# Patient Record
Sex: FEMALE | Race: WHITE
[De-identification: names, ages, dates, MRNs, and addresses within clinical notes are randomized per-mention and may not be internally consistent; named-entity substitution may affect disease eponyms.]

---

## 2019-02-28 ENCOUNTER — HOSPITAL ENCOUNTER (EMERGENCY)
Dept: HOSPITAL 10 - E/R | Age: 74
Discharge: HOME | End: 2019-02-28
Payer: COMMERCIAL

## 2019-02-28 ENCOUNTER — HOSPITAL ENCOUNTER (EMERGENCY)
Dept: HOSPITAL 91 - E/R | Age: 74
Discharge: HOME | End: 2019-02-28
Payer: COMMERCIAL

## 2019-02-28 VITALS — DIASTOLIC BLOOD PRESSURE: 79 MMHG | HEART RATE: 89 BPM | SYSTOLIC BLOOD PRESSURE: 128 MMHG | RESPIRATION RATE: 18 BRPM

## 2019-02-28 VITALS
WEIGHT: 176.81 LBS | HEIGHT: 63 IN | WEIGHT: 176.81 LBS | HEIGHT: 63 IN | BODY MASS INDEX: 31.33 KG/M2 | BODY MASS INDEX: 31.33 KG/M2

## 2019-02-28 DIAGNOSIS — I10: ICD-10-CM

## 2019-02-28 DIAGNOSIS — R06.02: ICD-10-CM

## 2019-02-28 DIAGNOSIS — Z79.82: ICD-10-CM

## 2019-02-28 DIAGNOSIS — E11.9: ICD-10-CM

## 2019-02-28 DIAGNOSIS — R09.89: ICD-10-CM

## 2019-02-28 DIAGNOSIS — Z79.84: ICD-10-CM

## 2019-02-28 DIAGNOSIS — J06.9: Primary | ICD-10-CM

## 2019-02-28 LAB
ADD MAN DIFF?: NO
ADD UMIC: YES
ALANINE AMINOTRANSFERASE: 36 IU/L (ref 13–69)
ALBUMIN/GLOBULIN RATIO: 1.32
ALBUMIN: 4.1 G/DL (ref 3.3–4.9)
ALKALINE PHOSPHATASE: 94 IU/L (ref 42–121)
ANION GAP: 10 (ref 5–13)
ASPARTATE AMINO TRANSFERASE: 28 IU/L (ref 15–46)
BASOPHIL #: 0 10^3/UL (ref 0–0.1)
BASOPHILS %: 0.5 % (ref 0–2)
BILIRUBIN,DIRECT: 0 MG/DL (ref 0–0.2)
BILIRUBIN,TOTAL: 0.3 MG/DL (ref 0.2–1.3)
BLOOD UREA NITROGEN: 13 MG/DL (ref 7–20)
CALCIUM: 9.7 MG/DL (ref 8.4–10.2)
CARBON DIOXIDE: 26 MMOL/L (ref 21–31)
CHLORIDE: 105 MMOL/L (ref 97–110)
CREATININE: 0.44 MG/DL (ref 0.44–1)
EOSINOPHILS #: 0.2 10^3/UL (ref 0–0.5)
EOSINOPHILS %: 2.6 % (ref 0–7)
GLOBULIN: 3.1 G/DL (ref 1.3–3.2)
GLUCOSE: 130 MG/DL (ref 70–220)
HEMATOCRIT: 37.9 % (ref 37–47)
HEMOGLOBIN: 13.3 G/DL (ref 12–16)
IMMATURE GRANS #M: 0.04 10^3/UL (ref 0–0.03)
IMMATURE GRANS % (M): 0.5 % (ref 0–0.43)
INR: 0.81
LYMPHOCYTES #: 1.6 10^3/UL (ref 0.8–2.9)
LYMPHOCYTES %: 18.4 % (ref 15–51)
MEAN CORPUSCULAR HEMOGLOBIN: 30.3 PG (ref 29–33)
MEAN CORPUSCULAR HGB CONC: 35.1 G/DL (ref 32–37)
MEAN CORPUSCULAR VOLUME: 86.3 FL (ref 82–101)
MEAN PLATELET VOLUME: 11.3 FL (ref 7.4–10.4)
MONOCYTE #: 0.6 10^3/UL (ref 0.3–0.9)
MONOCYTES %: 7.6 % (ref 0–11)
NEUTROPHIL #: 5.9 10^3/UL (ref 1.6–7.5)
NEUTROPHILS %: 70.4 % (ref 39–77)
NUCLEATED RED BLOOD CELLS #: 0 10^3/UL (ref 0–0)
NUCLEATED RED BLOOD CELLS%: 0 /100WBC (ref 0–0)
PARTIAL THROMBOPLASTIN TIME: 23.8 SEC (ref 23–35)
PLATELET COUNT: 213 10^3/UL (ref 140–415)
POTASSIUM: 4.4 MMOL/L (ref 3.5–5.1)
PROTIME: 11.3 SEC (ref 11.9–14.9)
PT RATIO: 0.9
RED BLOOD COUNT: 4.39 10^6/UL (ref 4.2–5.4)
RED CELL DISTRIBUTION WIDTH: 13.2 % (ref 11.5–14.5)
SODIUM: 141 MMOL/L (ref 135–144)
TOTAL PROTEIN: 7.2 G/DL (ref 6.1–8.1)
TROPONIN-I: < 0.012 NG/ML (ref 0–0.12)
UR ASCORBIC ACID: NEGATIVE MG/DL
UR BACTERIA: (no result) /HPF
UR BILIRUBIN (DIP): NEGATIVE MG/DL
UR BLOOD (DIP): (no result) MG/DL
UR CLARITY: (no result)
UR COLOR: YELLOW
UR GLUCOSE (DIP): NEGATIVE MG/DL
UR KETONES (DIP): NEGATIVE MG/DL
UR LEUKOCYTE ESTERASE (DIP): (no result) LEU/UL
UR NITRITE (DIP): NEGATIVE MG/DL
UR PH (DIP): 5 (ref 5–9)
UR RBC: 2 /HPF (ref 0–5)
UR SPECIFIC GRAVITY (DIP): 1.01 (ref 1–1.03)
UR SQUAMOUS EPITHELIAL CELL: (no result) /HPF
UR TOTAL PROTEIN (DIP): NEGATIVE MG/DL
UR UROBILINOGEN (DIP): NEGATIVE MG/DL
UR WBC: 3 /HPF (ref 0–5)
WHITE BLOOD COUNT: 8.4 10^3/UL (ref 4.8–10.8)

## 2019-02-28 PROCEDURE — 87086 URINE CULTURE/COLONY COUNT: CPT

## 2019-02-28 PROCEDURE — 87400 INFLUENZA A/B EACH AG IA: CPT

## 2019-02-28 PROCEDURE — 93005 ELECTROCARDIOGRAM TRACING: CPT

## 2019-02-28 PROCEDURE — 71045 X-RAY EXAM CHEST 1 VIEW: CPT

## 2019-02-28 PROCEDURE — 99285 EMERGENCY DEPT VISIT HI MDM: CPT

## 2019-02-28 PROCEDURE — 96374 THER/PROPH/DIAG INJ IV PUSH: CPT

## 2019-02-28 PROCEDURE — 87040 BLOOD CULTURE FOR BACTERIA: CPT

## 2019-02-28 PROCEDURE — 85025 COMPLETE CBC W/AUTO DIFF WBC: CPT

## 2019-02-28 PROCEDURE — 36415 COLL VENOUS BLD VENIPUNCTURE: CPT

## 2019-02-28 PROCEDURE — 83605 ASSAY OF LACTIC ACID: CPT

## 2019-02-28 PROCEDURE — 80053 COMPREHEN METABOLIC PANEL: CPT

## 2019-02-28 PROCEDURE — 84484 ASSAY OF TROPONIN QUANT: CPT

## 2019-02-28 PROCEDURE — 85610 PROTHROMBIN TIME: CPT

## 2019-02-28 PROCEDURE — 81001 URINALYSIS AUTO W/SCOPE: CPT

## 2019-02-28 PROCEDURE — 85730 THROMBOPLASTIN TIME PARTIAL: CPT

## 2019-02-28 RX ADMIN — ACETAMINOPHEN 1 MG: 325 TABLET, FILM COATED ORAL at 07:40

## 2019-02-28 RX ADMIN — THIAMINE HYDROCHLORIDE 1 MLS/HR: 100 INJECTION, SOLUTION INTRAMUSCULAR; INTRAVENOUS at 07:03

## 2019-02-28 RX ADMIN — KETOROLAC TROMETHAMINE 1 MG: 15 INJECTION, SOLUTION INTRAMUSCULAR; INTRAVENOUS at 07:40

## 2019-02-28 NOTE — ERD
ER Documentation


Chief Complaint


Chief Complaint





cough/sob/sore throat x 2 days





HPI


This is a 73-year-old female with a past medical history of hypertension, 


hyperlipidemia, diabetes, bronchitis, frequent URIs who is presenting with 2-3 


days of progressive worsening fever, chills, body aches, productive cough of 


clear sputum, mild shortness of breath.  The patient denies any wheezing or 


respiratory distress.  She denies chest pain or chest tightness or pleuritic 


pain.  The patient does not endorse any exacerbating or alleviating factors.





The patient has had no headache or vision changes.  The patient does not endorse


neck or back pain. The patient denies lightheadedness or dizziness.  The patient


denies nausea or vomiting. The patient denies abdominal pain. The patient denies


changes to bowel movements or urination.  The patient has had no focal deficits.


 The patient has had no weakness or numbness or tingling to the face or 


extremities.





ROS


All systems reviewed and are negative except as per history of present illness.





Medications


Home Meds


Reported Medications


Amoxicillin* (Amoxicillin*) 500 Mg Cap, 500 MG PO BID, #20 CAP


   19


Guaifenesin/Dextromethorphan* (Guaifenesin* DM) 1 Each Tablet, 1 TAB PO Q12, 


TAB.SA


   19


Gabapentin* (Gabapentin*) 300 Mg Capsule, 300 MG PO BID, #60 CAP


   19


Atorvastatin Calcium* (Atorvastatin Calcium*) 20 Mg Tablet, 20 MG PO QHS, #30 


TAB


   19


Losartan Potassium* (Cozaar*) 100 Mg Tablet, PO DAILY


   13


Discontinued Reported Medications


Simvastatin (Simvastatin) 10 Mg Tablet, PO DAILY


   13


Aspirin (Adult Low Dose Aspirin) 81 Mg Tablet.dr, PO DAILY


   13


Omeprazole* (Omeprazole*) 20 Mg Capsule.dr, PO DAILY


   13


Acetaminophen (Acetaminophen) 325 Mg Tablet, 650 MG PO QID


   13


Metformin* (Glucophage*) 1,000 Mg Tablet, PO BID


   13





Allergies


Allergies:  


Coded Allergies:  


     No Known Allergy (Unverified , 19)





PMhx/Soc


History of Surgery:  Yes (,Appy)


Anesthesia Reaction:  No


Hx Neurological Disorder:  No


Hx Respiratory Disorders:  Yes (Bronchitis)


Hx Cardiac Disorders:  Yes (Hypertension, hyperlipidemia, diabetes)


Hx Psychiatric Problems:  No


Hx Miscellaneous Medical Probl:  No


Hx Alcohol Use:  No


Hx Substance Use:  No


Hx Tobacco Use:  No


Smoking Status:  Never smoker





FmHx


Family History:  diabetes





Physical Exam


Vitals





Vital Signs


  Date      Temp   Pulse  Resp  B/P (MAP)   Pulse Ox  O2         O2 Flow    FiO2


Time                                                  Delivery   Rate


   19  100.2


     07:40


   19  100.2    101    20     215/103        95


     04:37                           (140)





Physical Exam


Const:   No apparent distress, well-developed, well-nourished


Head:   Normocephalic, Atraumatic 


Eyes:   Normal Conjunctiva.  Extraocular movements intact. Pupils equal, round 


and reactive to light


ENT:   Normal External Ears, Nose and Mouth.  Congested cough.  Normal 


oropharynx.


Neck:   Full range of motion. No meningismus.


Resp:   Clear to auscultation bilaterally, No wheezes, rales or rhonchi


Cardio:   Regular rate and rhythm. No murmurs, rubs or gallops


Abd:   Soft, non tender, non distended. Normal bowel sounds


Skin:   No petechiae or rashes


Back:   No midline tenderness. No CVA tenderness


Ext:   No cyanosis, or edema


Neur:   Awake and alert, oriented 4.  Cranial nerves intact.  No facial droop. 


Normal strength, sensation and coordination.


Psych:   Normal Mood and Affect


Result Diagram:  


19 0650                                                                    


           19 0630





Results 24 hrs





Laboratory Tests


Test
               19
05:30   19
06:30  19
06:36   19
06:50


Urine Color       YELLOW


Urine Clarity
    SLIGHTLY
CLOUDY  
               
              



Urine pH                     5.0


Urine Specific             1.010


Gravity


Urine Ketones     NEGATIVE mg/dL


Urine Nitrite     NEGATIVE mg/dL


Urine Bilirubin   NEGATIVE mg/dL


Urine             NEGATIVE mg/dL


Urobilinogen


Urine Leukocyte        1+ Agata/ul


Esterase


Urine                     2 /HPF


Microscopic RBC


Urine                     3 /HPF


Microscopic WBC


Urine Squamous    FEW /HPF 
       
               
              



Epithelial
Cells


Urine Bacteria    FEW /HPF


Urine Hemoglobin        1+ mg/dL


Urine Glucose     NEGATIVE mg/dL


Urine Total       NEGATIVE mg/dl


Protein


Prothrombin Time                        11.3 Sec


Prothrombin Time                             0.9


Ratio


INR               
                        0.81 
  
              



International


Normalized
Ratio


Activated         
                    23.8 Sec 
  
              



Partial
Thrombop


last Time


Sodium Level                          141 mmol/L


Potassium Level                       4.4 mmol/L


Chloride Level                        105 mmol/L


Carbon Dioxide                         26 mmol/L


Level


Anion Gap                                     10


Blood Urea                              13 mg/dl


Nitrogen


Creatinine                            0.44 mg/dl


Est Glomerular    
                 mL/min 
       
              



Filtrat


Rate
mL/min


Glucose Level                          130 mg/dl


Calcium Level                          9.7 mg/dl


Total Bilirubin                        0.3 mg/dl


Direct Bilirubin                      0.00 mg/dl


Indirect                               0.3 mg/dl


Bilirubin


Aspartate Amino   
                     28 IU/L 
  
              



Transf
(AST/SGOT


)


Alanine           
                     36 IU/L 
  
              



Aminotransferase



(ALT/SGPT)


Alkaline                                 94 IU/L


Phosphatase


Troponin I                         < 0.012 ng/ml


Total Protein                           7.2 g/dl


Albumin                                 4.1 g/dl


Globulin                               3.10 g/dl


Albumin/Globulin                            1.32


Ratio


POC Venous                                           1.7 mmol/L


Lactate


White Blood                                                         8.4 10^3/ul


Count


Red Blood Count                                                    4.39 10^6/ul


Hemoglobin                                                            13.3 g/dl


Hematocrit                                                               37.9 %


Mean Corpuscular                                                        86.3 fl


Volume


Mean Corpuscular                                                        30.3 pg


Hemoglobin


Mean Corpuscular  
                
               
                 35.1 g/dl 



Hemoglobin
Yamini


nt


Red Cell                                                                 13.2 %


Distribution


Width


Platelet Count                                                      213 10^3/UL


Mean Platelet                                                           11.3 fl


Volume


Immature                                                                0.500 %


Granulocytes %


Neutrophils %                                                            70.4 %


Lymphocytes %                                                            18.4 %


Monocytes %                                                               7.6 %


Eosinophils %                                                             2.6 %


Basophils %                                                               0.5 %


Nucleated Red                                                       0.0 /100WBC


Blood Cells %


Immature                                                          0.040 10^3/ul


Granulocytes #


Neutrophils #                                                       5.9 10^3/ul


Lymphocytes #                                                       1.6 10^3/ul


Monocytes #                                                         0.6 10^3/ul


Eosinophils #                                                       0.2 10^3/ul


Basophils #                                                         0.0 10^3/ul


Nucleated Red                                                       0.0 10^3/ul


Blood Cells #





Current Medications


 Medications
   Dose
          Sig/Adilene
       Start Time
   Status  Last


 (Trade)       Ordered        Route
 PRN     Stop Time              Admin
Dose


                              Reason                                Admin


 Sodium         1,570 ml @ 
   BOLUSX1        19


Chloride       785 mls/hr     ONCE
 IV
      06:30
                       07:03



                                             19 08:29


 Ketorolac
     15 mg          ONCE  STAT
    19       DC           19


Tromethamine
                 IV
            07:13
                       07:40



 (Toradol)                                   19 07:14


                650 mg         ONCE  ONCE
    19       DC           19


Acetaminophen                 PO
            07:30
                       07:40




  (Tylenol                                  19 07:31


Tab)








Procedures/MDM


MDM





The patient's presentation warrants further investigation. Previous medical 


records, if available, were reviewed.





LABS





The patient's laboratory testing was obtained and reviewed. No emergent 


treatment was required unless described below.





CBC:   No E/o of systemic infection or severe anemia or thrombocytopenia


Chemistry:   No E/o severe acidosis or alkalosis or renal failure or liver 


disease or diabetic ketoacidosis


PT/INR:   No E/o significant coagulopathy


Lactate:   No E/o severe sepsis


Troponin:   No E/o acute ischemia


Urine:   No E/o acute infection or hematuria





EKG





EKG read by me: 


Rate/Rhythm:    Regular rate and rhythm at a rate of 94 bpm with a PAC evident


Intervals:    Normal


Axis:    Normal


Impression:    No evidence of acute ischemia or arrhythmia





IMAGING





Imaging and Radiology interpretation reviewed.





CXR


FINDINGS: The heart is mildly enlarged. Atherosclerosis of the aorta. No 


evidence of pulmonary vascular congestion acute lung consolidation pleural 


effusions or pneumothorax.


IMPRESSION: No significant change. Mild cardiomegaly without congestive heart 


failure or pneumonia.


Electronically viewed and signed by Physician Michael on 2019 06:19 





TREATMENT/DISPOSITION





The patient influenza study is positive for influenza B, which correlates with 


her symptoms.  The patient's symptoms have been ongoing for 5 days.  That said, 


she is 73.  I do intend to treat with Tamiflu.  The patient does have an 


elevated temperature but is technically not febrile.  She does not meet criteria


for a systemic inflammatory response syndrome.  I have low suspicion for sepsis.


 The patient has no lymphadenopathy.  She does have a congested cough.  I have 


very low suspicion for strep pharyngitis.  The patient's influenza study was 


negative.  I do not see evidence of pneumonia.  The patient is not wheezing.  I 


have low suspicion for asthma, COPD or bronchitis.  There is no evidence of 


heart failure.





The patient was treated with IV fluids, Tylenol and Toradol.





Upon reevaluation of the patient, symptoms have improved. No emergent diagnoses 


were identified. At this time, I feel that the patient stable for discharge.  


The patient was instructed to follow-up with a primary care physician in 1-3 


days.  The patient will be given strict precautions with which to return to the 


emergency department.





Prescriptions: Ibuprofen, Tamiflu





The patient's blood pressure was elevated at greater than 120/80 while in the 


emergency department.  The patient was otherwise stable with no evidence of 


hypertensive urgency or emergency.  The patient does not require admission for 


blood pressure control. I have discussed with the patient the risks of 


hypertension. I have instructed the patient to return to the ER for any new or 


worsening symptoms including chest pain, shortness of breath, headache, blurred 


vision, confusion, nausea, vomiting or LOC. I have advised the patient to follow


up with the primary care physician for outpatient monitoring and treatment for 


hypertension in 1-3 days.





Disclaimer: Inadvertent spelling and grammatical errors are likely due to 


EHR/dictation software use and do not reflect on the overall quality of patient 


care. Note that the electronic time recorded on this note does not necessarily 


reflect the actual time of the patient encounter.





Departure


Diagnosis:  


   Primary Impression:  


   URI (upper respiratory infection)


   URI type:  unspecified URI  Qualified Codes:  J06.9 - Acute upper respiratory


   infection, unspecified


   Additional Impressions:  


   Cough


   Chest congestion


   Fever


   Fever type:  unspecified  Qualified Codes:  R50.9 - Fever, unspecified


Condition:  Stable


Patient Instructions:  Influenza





Additional Instructions:  


Thank you for for coming to [facility] for your care today. 


Please ask your nurse or provider if you have questions about your care today 


and do not leave until all your questions have been answered.  Please use any 


medications given as directed and follow-up with your doctor (or the doctor you 


were referred to) in the next 1-3 days. If you do not have a primary care doctor


you may follow up at the [facility] or [facility] clinic (listed below). You


may also use motrin and tylenol as needed for fever and/or pain unless 


instructed otherwise by your provider or nurse. Indications for more urgent 


follow-up have been discussed, but you may return to the Emergency Department at


ANY time for any worrisome or worsening symptoms.





If you have abdominal pain, please know that no test or exam you received is 


perfect and you should follow up within 8 hours for continued pain.





If you had any imaging studies today, such as an X-Ray or CT Scan, these studies


will be reviewed later by a radiologist. You will be called if there are 


important findings that were not identified today, so make sure the contact 


information you provided at registration is correct.





If you received any narcotic pain control medicine today, such as Vicodin, 


Morphine or Dilaudid, your coordination and judgment may be affected for a 


number of hours. Please do not drive or operate heavy machinery, and you may 


want someone to assist you at home. If you were given a prescription for 


narcotic medication, be aware that it is very addictive- use sparingly and only 


if necessary.





PLEASE SEEK FURTHER EVALUATION AND MANAGEMENT AT YOUR DOCTORS OFFICE WITHIN THE 


NEXT 1-3 DAYS. IT IS YOUR RESPONSIBILITY TO MAKE AN APPOINTMENT FOR FOLOW-UP 


CARE.





IF YOU HAVE A PRIMARY DOCTOR, PLEASE CALL THEIR OFFICE TO SCHEDULE AN APPOI


NTMENT FOR FOLLOW UP.





IF YOU DO NOT HAVE A PRIMARY DOCTOR YOU CAN CALL OUR PHYSICIAN REFERRAL HOTLINE 


AT [phone] 





IF YOU CAN NOT AFFORD TO SEE A PHYSICIAN YOU CAN CHOSE FROM THE FOLLOWING On license of [facility] CLINICS:





[facility] [phone] [address] [facility] [phone] [address] [facility]. [facility] [phone] [facility] [phone] [address] [facility] [phone] [address] [facility]. [facility]. [phone] [address] ANGEL GARIBAY MD              2019 06:28